# Patient Record
Sex: FEMALE | Race: BLACK OR AFRICAN AMERICAN
[De-identification: names, ages, dates, MRNs, and addresses within clinical notes are randomized per-mention and may not be internally consistent; named-entity substitution may affect disease eponyms.]

---

## 2019-08-08 ENCOUNTER — HOSPITAL ENCOUNTER (OUTPATIENT)
Dept: HOSPITAL 74 - JINFUSION | Age: 34
Discharge: HOME | End: 2019-08-08
Attending: OBSTETRICS & GYNECOLOGY
Payer: COMMERCIAL

## 2019-08-08 VITALS — HEART RATE: 67 BPM | DIASTOLIC BLOOD PRESSURE: 67 MMHG | TEMPERATURE: 98.2 F | SYSTOLIC BLOOD PRESSURE: 104 MMHG

## 2019-08-08 DIAGNOSIS — Z3A.34: ICD-10-CM

## 2019-08-08 DIAGNOSIS — D50.9: ICD-10-CM

## 2019-08-08 DIAGNOSIS — O99.013: Primary | ICD-10-CM

## 2019-08-08 LAB
DEPRECATED RDW RBC AUTO: 12.4 % (ref 11.6–15.6)
HCT VFR BLD CALC: 29 % (ref 32.4–45.2)
HGB BLD-MCNC: 9.8 GM/DL (ref 10.7–15.3)
MCH RBC QN AUTO: 30.3 PG (ref 25.7–33.7)
MCHC RBC AUTO-ENTMCNC: 33.8 G/DL (ref 32–36)
MCV RBC: 89.6 FL (ref 80–96)
PLATELET # BLD AUTO: 271 K/MM3 (ref 134–434)
PMV BLD: 7.4 FL (ref 7.5–11.1)
RBC # BLD AUTO: 3.24 M/MM3 (ref 3.6–5.2)
WBC # BLD AUTO: 7.4 K/MM3 (ref 4–10)

## 2019-08-08 PROCEDURE — 3E033GC INTRODUCTION OF OTHER THERAPEUTIC SUBSTANCE INTO PERIPHERAL VEIN, PERCUTANEOUS APPROACH: ICD-10-PCS | Performed by: OBSTETRICS & GYNECOLOGY

## 2019-08-16 ENCOUNTER — HOSPITAL ENCOUNTER (OUTPATIENT)
Dept: HOSPITAL 74 - JINFUSION | Age: 34
Discharge: HOME | End: 2019-08-16
Attending: OBSTETRICS & GYNECOLOGY
Payer: COMMERCIAL

## 2019-08-16 VITALS — HEART RATE: 78 BPM | SYSTOLIC BLOOD PRESSURE: 111 MMHG | DIASTOLIC BLOOD PRESSURE: 69 MMHG | TEMPERATURE: 98.8 F

## 2019-08-16 DIAGNOSIS — Z3A.34: ICD-10-CM

## 2019-08-16 DIAGNOSIS — O99.013: Primary | ICD-10-CM

## 2019-08-16 PROCEDURE — 3E033GC INTRODUCTION OF OTHER THERAPEUTIC SUBSTANCE INTO PERIPHERAL VEIN, PERCUTANEOUS APPROACH: ICD-10-PCS | Performed by: OBSTETRICS & GYNECOLOGY

## 2019-08-28 NOTE — HP
Admitting History and Physical





- Admission


Chief Complaint: Anemia


History of Present Illness: 





32 y/o  female with SIUP  at 34 weeks gestation here with anemia refractory 

to oral iron. 


History Source: Patient, Medical Record





- Past Medical History


Reproductive: No: Endometriosis, PID, Polycystic Ovary Syndrome


Heme/Onc: Yes: Anemia


Infectious Disease: No: HIV, MRSA, STD's


Psych: No: Anxiety, Bipolar, Depression





- Past Surgical History


Past Surgical History: Yes: None





- Advance Directives


Advance Directives: Yes: Health Care Proxy





- Social History


Usual Living Arrangement: Yes: With Spouse


ADL: Independent


History of Recent Travel: No





Home Medications





- Allergies


Allergies/Adverse Reactions: 


 Allergies











Allergy/AdvReac Type Severity Reaction Status Date / Time


 


shellfish derived Allergy Unknown  Verified 19 12:21


 


cats Allergy Unknown  Uncoded 19 12:21


 


dogs Allergy Unknown  Uncoded 19 12:21


 


shell Allergy Unknown  Uncoded 19 12:21














Review of Systems





- Review of Systems


Constitutional: reports: No Symptoms


Eyes: reports: No Symptoms


HENT: reports: No Symptoms


Neck: reports: No Symptoms


Cardiovascular: reports: No Symptoms


Respiratory: reports: No Symptoms


Gastrointestinal: reports: No Symptoms


Genitourinary: reports: No Symptoms


Breasts: reports: No Symptoms Reported


Musculoskeletal: reports: No Symptoms


Integumentary: reports: No Symptoms


Neurological: reports: No Symptoms


Endocrine: reports: No Symptoms


Hematology/Lymphatic: reports: No Symptoms





Physical Examination


Vital Signs: 


 Vital Signs











Temperature  98.2 F   19 15:05


 


Pulse Rate  67   19 15:05


 


Respiratory Rate  18   19 15:05


 


Blood Pressure  104/67   19 15:05


 


O2 Sat by Pulse Oximetry (%)      











Constitutional: Yes: Well Nourished, No Distress, Calm


Labs: 


 CBC, BMP





 19 12:50 











Problem List





- Problems


(1) Anemia affecting pregnancy


Code(s): O99.019 - ANEMIA COMPLICATING PREGNANCY, UNSPECIFIED TRIMESTER   


Qualifiers: 


   Trimester: third trimester   Qualified Code(s): O99.013 - Anemia 

complicating pregnancy, third trimester   





Assessment/Plan





for IV iron infusion

## 2019-08-28 NOTE — HP
Admitting History and Physical





- Admission


History of Present Illness: 





pt with SIUP at 35 weeks gestation here for 2nd IV iron infusion for anemia in 

pregnancy refractory to oral Iron. 


History Source: Patient, Medical Record


Limitations to Obtaining History: No Limitations





- Past Medical History


Cardiovascular: No: HTN


Pulmonary: No: COPD


Renal/: No: UTI


...Pregnant: Yes


Heme/Onc: Yes: Anemia


Infectious Disease: No: HIV, MRSA, STD's


Psych: No: Anxiety, Bipolar, Depression





- Past Surgical History


Past Surgical History: Yes: None





- Smoking History


Have you smoked in the past 12 months: No





- Alcohol/Substance Use


History of Substance Use: reports: None





- Social History


Usual Living Arrangement: Yes: With Spouse


ADL: Independent





Home Medications





- Allergies


Allergies/Adverse Reactions: 


 Allergies











Allergy/AdvReac Type Severity Reaction Status Date / Time


 


shellfish derived Allergy Unknown  Verified 08/08/19 12:21


 


cats Allergy Unknown  Uncoded 08/08/19 12:21


 


dogs Allergy Unknown  Uncoded 08/08/19 12:21


 


shell Allergy Unknown  Uncoded 08/08/19 12:21














Review of Systems





- Review of Systems


Constitutional: reports: No Symptoms


Eyes: reports: No Symptoms


HENT: reports: No Symptoms


Neck: reports: No Symptoms


Cardiovascular: reports: No Symptoms


Respiratory: reports: No Symptoms


Gastrointestinal: reports: No Symptoms


Genitourinary: reports: No Symptoms


Breasts: reports: No Symptoms Reported


Musculoskeletal: reports: No Symptoms


Integumentary: reports: No Symptoms


Neurological: reports: No Symptoms


Endocrine: reports: No Symptoms


Hematology/Lymphatic: reports: No Symptoms


Psychiatric: reports: No Symptoms





Physical Examination


Vital Signs: 


 Vital Signs











Temperature  98.8 F   08/16/19 15:05


 


Pulse Rate  78   08/16/19 15:05


 


Respiratory Rate  18   08/16/19 15:05


 


Blood Pressure  111/69   08/16/19 15:05


 


O2 Sat by Pulse Oximetry (%)      











Constitutional: Yes: Well Nourished, No Distress, Calm





Problem List





- Problems


(1) Anemia affecting pregnancy


Code(s): O99.019 - ANEMIA COMPLICATING PREGNANCY, UNSPECIFIED TRIMESTER   





Assessment/Plan





for 2nd infusion of IV Iron

## 2019-09-20 ENCOUNTER — HOSPITAL ENCOUNTER (INPATIENT)
Dept: HOSPITAL 74 - JLDR | Age: 34
LOS: 3 days | Discharge: HOME | End: 2019-09-23
Attending: OBSTETRICS & GYNECOLOGY | Admitting: OBSTETRICS & GYNECOLOGY
Payer: COMMERCIAL

## 2019-09-20 VITALS — BODY MASS INDEX: 28.7 KG/M2

## 2019-09-20 DIAGNOSIS — Z3A.40: ICD-10-CM

## 2019-09-20 LAB
ANION GAP SERPL CALC-SCNC: 8 MMOL/L (ref 8–16)
APTT BLD: 26.9 SECONDS (ref 25.2–36.5)
BASOPHILS # BLD: 0.5 % (ref 0–2)
BUN SERPL-MCNC: 4.3 MG/DL (ref 7–18)
CALCIUM SERPL-MCNC: 8.8 MG/DL (ref 8.5–10.1)
CHLORIDE SERPL-SCNC: 106 MMOL/L (ref 98–107)
CO2 SERPL-SCNC: 24 MMOL/L (ref 21–32)
CREAT SERPL-MCNC: 0.5 MG/DL (ref 0.55–1.3)
DEPRECATED RDW RBC AUTO: 13.4 % (ref 11.6–15.6)
EOSINOPHIL # BLD: 0.9 % (ref 0–4.5)
GLUCOSE SERPL-MCNC: 81 MG/DL (ref 74–106)
HCT VFR BLD CALC: 32.9 % (ref 32.4–45.2)
HGB BLD-MCNC: 11.1 GM/DL (ref 10.7–15.3)
INR BLD: 0.95 (ref 0.83–1.09)
LYMPHOCYTES # BLD: 14.3 % (ref 8–40)
MCH RBC QN AUTO: 31.2 PG (ref 25.7–33.7)
MCHC RBC AUTO-ENTMCNC: 33.7 G/DL (ref 32–36)
MCV RBC: 92.6 FL (ref 80–96)
MONOCYTES # BLD AUTO: 7.9 % (ref 3.8–10.2)
NEUTROPHILS # BLD: 76.4 % (ref 42.8–82.8)
PLATELET # BLD AUTO: 251 K/MM3 (ref 134–434)
PMV BLD: 7.8 FL (ref 7.5–11.1)
POTASSIUM SERPLBLD-SCNC: 3.6 MMOL/L (ref 3.5–5.1)
PT PNL PPP: 11.2 SEC (ref 9.7–13)
RBC # BLD AUTO: 3.55 M/MM3 (ref 3.6–5.2)
SODIUM SERPL-SCNC: 138 MMOL/L (ref 136–145)
WBC # BLD AUTO: 8.2 K/MM3 (ref 4–10)

## 2019-09-20 RX ADMIN — SODIUM CHLORIDE, SODIUM GLUCONATE, SODIUM ACETATE, POTASSIUM CHLORIDE, AND MAGNESIUM CHLORIDE SCH MLS/HR: 526; 502; 368; 37; 30 INJECTION, SOLUTION INTRAVENOUS at 12:30

## 2019-09-20 NOTE — PN
Ante-Partal Exam





- Subjective


Subjective: 





Pt feeling more pain, s/p epidural top off. 


Vital Signs: 


 Vital Signs











Temperature  98.3 F   09/20/19 16:00


 


Pulse Rate  70   09/20/19 17:15


 


Respiratory Rate  16   09/20/19 17:15


 


Blood Pressure  121/66   09/20/19 17:15


 


O2 Sat by Pulse Oximetry (%)  100   09/20/19 17:15











Bleeding: Yes


Bleeding Description: Mild


Headache: No


Visual changes: No


Right upper quadrant pain: No





- Contractions


Contractions: Yes


Regularity: Regular


Intensity: Mild/Mod





- Exam during Labor


Fetal Heart Rate: 125


Variability: Moderate


Category: I


Fetal Monitor Accelerations: Present


Fetal Monitor Decelerations: Early (nonrecurrent)


Exam: Vaginal


Dilatation (cm): 3-4


Effacement (%): 70


Amniotic Membrane Status: Ruptured (AROM for scant clear fluid)


Fetal Presentation: Vertex


Fetal Station: -2





- Intrapartum Hemorrhage Risk


Medium Risk Factors: None


High Risk Factors: None


Risk Score: 0


Risk Level: Low Risk





- Assessment/Plan


Assessment/Plan: 





s/p AROM 


to start pitocin if contractions space out 


continue epidural for pain control


re evaluate prn

## 2019-09-20 NOTE — HP
Past Medical History





- Admission


History Source: Patient, Medical Record


Limitations to Obtaining History: No Limitations





- Past Medical History


Cardiovascular: No: AFIB, HTN


Pulmonary: No: COPD


Gastrointestinal: No: GERD


Hepatobiliary: No: Hepatitis B, Hepatitis C


Reproductive: No: Ectopic Pregnancy, PID


...: 1


...Para: 0


...Term: 0


...: 0


...Spon : 0


...Induced : 0


...Multiple Gestation: 0


...LMP: 18


... Weeks Gestation by Dates: 40.2


...EDC by Dates: 19


Heme/Onc: Yes: Anemia


Infectious Disease: No: HIV, MRSA, STD's


Psych: No: Bipolar, Depression, Panic





- Past Surgical History


Past Surgical History: Yes: None


Hx Myomectomy: No


Hx Transabdominal Cerclage: No





- Smoking History


Smoking history: Never smoked


Have you smoked in the past 12 months: No





- Alcohol/Substance Use


Hx Alcohol Use: No


History of Substance Use: reports: None





- Social History


ADL: Independent


History of Recent Travel: No





Home Medications





- Allergies


Allergies/Adverse Reactions: 


 Allergies











Allergy/AdvReac Type Severity Reaction Status Date / Time


 


shellfish derived Allergy Unknown  Verified 19 03:18


 


cats Allergy Unknown  Uncoded 19 03:18


 


dogs Allergy Unknown  Uncoded 19 03:18


 


shell Allergy Unknown  Uncoded 19 03:18














- Home Medications


Home Medications: 


Ambulatory Orders





NK [No Known Home Medication]  19 











Review of Systems





- Review of Systems


Constitutional: reports: No Symptoms


Eyes: reports: No Symptoms


HENT: reports: No Symptoms


Neck: reports: No Symptoms


Cardiovascular: reports: No Symptoms


Respiratory: reports: No Symptoms


Gastrointestinal: reports: No Symptoms


Genitourinary: reports: No Symptoms


Breasts: reports: No Symptoms Reported


Musculoskeletal: reports: No Symptoms


Integumentary: reports: No Symptoms


Neurological: reports: No Symptoms


Endocrine: reports: No Symptoms


Hematology/Lymphatic: reports: No Symptoms


Psychiatric: reports: No Symptoms





Physical Exam - Maternity


Vital Signs: 


 Vital Signs











Temperature  98.7 F   19 12:00


 


Pulse Rate  77   19 12:00


 


Respiratory Rate  20   19 12:00


 


Blood Pressure  126/76   19 12:00


 


O2 Sat by Pulse Oximetry (%)      











Constitutional: Yes: Well Nourished, No Distress, Calm


Eyes: Yes: Conjunctiva Clear, EOM Intact


HENT: Yes: Atraumatic


Neck: Yes: Supple


Cardiovascular: Yes: Regular Rate and Rhythm


Lungs: Clear to auscultation





- Abdominal Exam/OB


Number of Fetuses: Single


Fetal Presentation: Vertex


Contractions: Yes


Regularity: Regular


Category: I


Accelerations: Uniform


Decelerations: None





- Vaginal Exam/OB


Dilatation (cm): 3


Effacement (%): 70


Fetal Presentation: Vertex/Position


Fetal Station: -2





- Physical Exam


Psychiatric: Yes: Alert, Oriented





- Labs


Lab Results: 


 CBC, BMP





 19 10:28 





 19 10:28 











Hemorrhage Risk Assessment





- Risk Factors


Medium Risk Factors: Yes: None


High Risk Factors: Yes: None


Risk Score: 1


Risk Level: Medium Risk





Problem List





- Problems


(1) Anemia


Code(s): D64.9 - ANEMIA, UNSPECIFIED   





(2) Term pregnancy


Code(s): Z34.90 - ENCNTR FOR SUPRVSN OF NORMAL PREGNANCY, UNSP, UNSP TRIMESTER 

  





Assessment/Plan





34 y/o with SIUP at 40.2 weeks, early labor


FHTS cat 1


epidural in place, pt comfortable


continuous monitoring


if contractions space, will start pitocin


for AROM later


continue present management

## 2019-09-21 PROCEDURE — 0KQM0ZZ REPAIR PERINEUM MUSCLE, OPEN APPROACH: ICD-10-PCS | Performed by: OBSTETRICS & GYNECOLOGY

## 2019-09-21 RX ADMIN — Medication SCH MLS/HR: at 09:32

## 2019-09-21 RX ADMIN — SODIUM CHLORIDE, SODIUM GLUCONATE, SODIUM ACETATE, POTASSIUM CHLORIDE, AND MAGNESIUM CHLORIDE SCH MLS/HR: 526; 502; 368; 37; 30 INJECTION, SOLUTION INTRAVENOUS at 07:50

## 2019-09-21 RX ADMIN — FERROUS SULFATE TAB EC 324 MG (65 MG FE EQUIVALENT) SCH: 324 (65 FE) TABLET DELAYED RESPONSE at 18:32

## 2019-09-21 RX ADMIN — Medication SCH MLS/HR: at 13:38

## 2019-09-21 RX ADMIN — FERROUS SULFATE TAB EC 324 MG (65 MG FE EQUIVALENT) SCH: 324 (65 FE) TABLET DELAYED RESPONSE at 12:39

## 2019-09-21 RX ADMIN — ACETAMINOPHEN PRN MG: 325 TABLET ORAL at 12:39

## 2019-09-21 RX ADMIN — IBUPROFEN PRN MG: 600 TABLET, FILM COATED ORAL at 12:41

## 2019-09-21 NOTE — PROC
Obstetrical Vaccum Device





- Doc. Following  Use of Vaccum Device


Indications for use: Fetal Bradycardia


Risks and Benefits Explained: Yes


Consent on Chart: Yes


Dilation (0-10): 10


Fetal Station: 1


Molding: No


Position: OA (Left OA)


Proper placement of cup confirmed: Yes


Number of pulls: 2


Number of pop-offs: 1


Duration of time cup on fetal head (min): 1 (less than 1 minute)


Maximum pressure attained: 0 (550 mmHG)


Total time cup near/at maximum pressure (min): 30 (seconds)


Reduction of pressure between contractions: Yes


Outcome: Vaginal delivery


Appearance of fetal head on delivery: Molding


Pediatrician present during vacuum extraction: No


Pediatrician & nursery staff notified of vacuum extraction: Yes

## 2019-09-21 NOTE — PN
Delivery





- Delivery


Vaginal Delivery: Vacuum Extraction


Type of Anesthesia: Epidural


Episiotomy/Laceration: 2nd degree


EBL (cc): 300





Delivery, Single Birth





- Stages of Labor


Date of Delivery: 19


Time of Delivery: :


Date Placenta Delivered: 19


Time Placenta Delivered: 


Placenta: Yes: Spontaneous





- Condition of Infant


Pediatrician/Neonatologist Present: No


Infant Gender: Female


Position: Left, OA





- Apgar


  ** 1 Minute


Apgar Total Score: 9





  ** 5 Minutes


Apgar Total Score: 9





-  Feeding Plan


Initial Plan: Exclusive breastfeeding throughout hospitalization





Remarks





- Remarks


Remarks: 





Vacuum assisted VD Of baby girl from ADONIS position


due to fetal bradycardia, vacuum device applied during 2nd stage to facilitate 

delivery (see vacuum delivery noted) using rigid vacuum/kiwi device


after delivery of fetal head, anterior shoulder (right) and remainder of 

 delivered with ease


cord clamped and cut, 3vc noted, taken to warmer for immediate evaluation by 

nursery staff - Apgard 9/9 assigned


placenta delivered spontaneously and in tact


2nd degree laceration repaired with 2-0 vicryl with good hemostatic/cosmetic 

result


sponge and needle count correct


mom stable


baby to well baby nursery

## 2019-09-21 NOTE — PN
Ante-Partal Exam





- Subjective


Subjective: 





Pt feeling on and off rectal pressure.  Comfortable with epidural top off from 

this morning.   


Vital Signs: 


 Vital Signs











Temperature  98.5 F   19 07:00


 


Pulse Rate  75   19 07:45


 


Respiratory Rate  20   19 07:45


 


Blood Pressure  131/74   19 07:45


 


O2 Sat by Pulse Oximetry (%)  100   19 07:45











Bleeding: Yes


Bleeding Description: Mild


Headache: No


Visual changes: No


Right upper quadrant pain: No


Pain (scale 1-10): 0





- Contractions


Contractions: Yes


Regularity: Regular


Intensity: Mod/Strong





- Exam during Labor


Fetal Monitor Accelerations: Present


Fetal Monitor Decelerations: Prolonged (prolonged deceration X 6 minutes down 

90s, resolved with position change)


Exam: Vaginal


Dilatation (cm): 8


Effacement (%): 100


Fetal Presentation: Vertex


Fetal Station: +1





- Assessment/Plan


Assessment/Plan: 





34 y/o with SIUP at 40.3 weeks, admitted in labor, augmented with pitocin 


category 2 tracing, recovered with oxygen nonrebreather and position change, 

continues to have early decelerations, will monitor


GBS negative


continue pitocin


anticipate

## 2019-09-22 LAB
BASOPHILS # BLD: 0.3 % (ref 0–2)
DEPRECATED RDW RBC AUTO: 13.2 % (ref 11.6–15.6)
EOSINOPHIL # BLD: 1.9 % (ref 0–4.5)
HCT VFR BLD CALC: 29.2 % (ref 32.4–45.2)
HGB BLD-MCNC: 9.8 GM/DL (ref 10.7–15.3)
LYMPHOCYTES # BLD: 8.6 % (ref 8–40)
MCH RBC QN AUTO: 31.4 PG (ref 25.7–33.7)
MCHC RBC AUTO-ENTMCNC: 33.6 G/DL (ref 32–36)
MCV RBC: 93.4 FL (ref 80–96)
MONOCYTES # BLD AUTO: 6.4 % (ref 3.8–10.2)
NEUTROPHILS # BLD: 82.8 % (ref 42.8–82.8)
PLATELET # BLD AUTO: 221 K/MM3 (ref 134–434)
PMV BLD: 7.8 FL (ref 7.5–11.1)
RBC # BLD AUTO: 3.13 M/MM3 (ref 3.6–5.2)
WBC # BLD AUTO: 14.2 K/MM3 (ref 4–10)

## 2019-09-22 RX ADMIN — FERROUS SULFATE TAB EC 324 MG (65 MG FE EQUIVALENT) SCH: 324 (65 FE) TABLET DELAYED RESPONSE at 18:49

## 2019-09-22 RX ADMIN — IBUPROFEN PRN MG: 600 TABLET, FILM COATED ORAL at 07:57

## 2019-09-22 RX ADMIN — FERROUS SULFATE TAB EC 324 MG (65 MG FE EQUIVALENT) SCH: 324 (65 FE) TABLET DELAYED RESPONSE at 07:55

## 2019-09-22 RX ADMIN — FERROUS SULFATE TAB EC 324 MG (65 MG FE EQUIVALENT) SCH: 324 (65 FE) TABLET DELAYED RESPONSE at 14:31

## 2019-09-22 RX ADMIN — IBUPROFEN PRN MG: 600 TABLET, FILM COATED ORAL at 18:57

## 2019-09-22 RX ADMIN — ACETAMINOPHEN PRN MG: 325 TABLET ORAL at 07:55

## 2019-09-22 RX ADMIN — ACETAMINOPHEN PRN MG: 325 TABLET ORAL at 18:56

## 2019-09-22 NOTE — PN
Post Partum Progress Note





- Subjective


Subjective: 





Pt doing well, no complaints.  Tolerating diet.  Ambulating, voiding, passing 

flatus.


Type of Delivery: 


Vital Signs: 


 Vital Signs











Temperature  97.9 F   19 05:43


 


Pulse Rate  60   19 05:43


 


Respiratory Rate  18   19 05:43


 


Blood Pressure  117/56 L  19 05:43


 


O2 Sat by Pulse Oximetry (%)  100   19 10:45











Uterus: Yes: Fundus Firm


Abdomen/GI: Yes: Abdomen soft


Lochia: Yes: Rubra


Lochia, amount: Small


Extremities: Yes: Calves non-tender


Perineum: Yes: Laceration (repaired and in tact)


Activity: Ambulating





- Labs


Labs: 


 CBC











WBC  14.2 K/mm3 (4.0-10.0)  H  19  06:30    


 


RBC  3.13 M/mm3 (3.60-5.2)  L  19  06:30    


 


Hgb  9.8 GM/dL (10.7-15.3)  L  19  06:30    


 


Hct  29.2 % (32.4-45.2)  L  19  06:30    


 


MCV  93.4 fl (80-96)   19  06:30    


 


MCH  31.4 pg (25.7-33.7)   19  06:30    


 


MCHC  33.6 g/dl (32.0-36.0)   19  06:30    


 


RDW  13.2 % (11.6-15.6)   19  06:30    


 


Plt Count  221 K/MM3 (134-434)   19  06:30    


 


MPV  7.8 fl (7.5-11.1)   19  06:30    


 


Absolute Neuts (auto)  11.8 K/mm3 (1.5-8.0)  H  19  06:30    


 


Neutrophils %  82.8 % (42.8-82.8)   19  06:30    


 


Lymphocytes %  8.6 % (8-40)  D 19  06:30    


 


Monocytes %  6.4 % (3.8-10.2)   19  06:30    


 


Eosinophils %  1.9 % (0-4.5)  D 19  06:30    


 


Basophils %  0.3 % (0-2.0)   19  06:30    


 


Nucleated RBC %  0 % (0-0)   19  06:30    














Problem List





- Problems


(1) Anemia


Code(s): D64.9 - ANEMIA, UNSPECIFIED   





(2) Term pregnancy


Code(s): Z34.90 - ENCNTR FOR SUPRVSN OF NORMAL PREGNANCY, UNSP, UNSP TRIMESTER 

  





Assessment/Plan





34 y/o PPD#1 s/p , vacuum assisted


pt doing well


AFVSS


Regular diet


PO pain meds


ambulation


routine care

## 2019-09-23 VITALS — DIASTOLIC BLOOD PRESSURE: 73 MMHG | TEMPERATURE: 97.6 F | HEART RATE: 69 BPM | SYSTOLIC BLOOD PRESSURE: 128 MMHG

## 2019-09-23 RX ADMIN — IBUPROFEN PRN MG: 600 TABLET, FILM COATED ORAL at 07:48

## 2019-09-23 RX ADMIN — ACETAMINOPHEN PRN MG: 325 TABLET ORAL at 02:13

## 2019-09-23 RX ADMIN — IBUPROFEN PRN MG: 600 TABLET, FILM COATED ORAL at 02:12

## 2019-09-23 RX ADMIN — FERROUS SULFATE TAB EC 324 MG (65 MG FE EQUIVALENT) SCH: 324 (65 FE) TABLET DELAYED RESPONSE at 09:26

## 2019-09-23 RX ADMIN — ACETAMINOPHEN PRN MG: 325 TABLET ORAL at 07:48

## 2019-09-23 NOTE — DS
Physical Exam-GYN


Vital Signs: 


 Vital Signs











Temperature  98.4 F   19 22:00


 


Pulse Rate  79   19 22:00


 


Respiratory Rate  18   19 22:00


 


Blood Pressure  129/75   19 22:00


 


O2 Sat by Pulse Oximetry (%)  100   19 10:45











Labs: 


 CBC, BMP





 19 06:30 





 19 10:28 











Delivery





- Delivery


Vaginal Delivery: Vacuum Extraction


Type of Anesthesia: Epidural


Episiotomy/Laceration: 2nd degree


EBL (cc): 300





Delivery, Single Birth





- Stages of Labor


Date 1st Stage Initiatied: 19


Time 1st Stage Initiated: 09:00


Date 2nd Stage Initiated: 19


Time 2nd Stage Initiated: 09:10


Date of Delivery: 19


Time of Delivery: 09:28


Time Placenta Delivered: 09:32


Placenta: Yes: Spontaneous





- Condition of Infant


Pediatrician/Neonatologist Present: No


Infant Gender: Female


Birth Weight: 6 lb


Position: Left, OA


Total Hours ROM (Hrs/Mins): 15hrs 42min





- Apgar


  ** 1 Minute


Apgar Total Score: 9





  ** 5 Minutes


Apgar Total Score: 9





- [place] Feeding Plan


Initial Plan: Exclusive breastfeeding throughout hospitalization





Discharge Summary


Reason For Visit: LABOR ADMISSION


Current Active Problems





Anemia (Acute)


Term pregnancy (Acute)











- Instructions





- Home Medications


Comprehensive Discharge Medication List: 


Ambulatory Orders





NK [No Known Home Medication]  19